# Patient Record
Sex: FEMALE | Race: WHITE
[De-identification: names, ages, dates, MRNs, and addresses within clinical notes are randomized per-mention and may not be internally consistent; named-entity substitution may affect disease eponyms.]

---

## 2018-04-17 ENCOUNTER — HOSPITAL ENCOUNTER (EMERGENCY)
Dept: HOSPITAL 45 - C.EDB | Age: 68
Discharge: HOME | End: 2018-04-17
Payer: COMMERCIAL

## 2018-04-17 VITALS
WEIGHT: 195.99 LBS | HEIGHT: 64.02 IN | HEIGHT: 64.02 IN | WEIGHT: 195.99 LBS | BODY MASS INDEX: 33.46 KG/M2 | BODY MASS INDEX: 33.46 KG/M2

## 2018-04-17 VITALS — HEART RATE: 71 BPM | SYSTOLIC BLOOD PRESSURE: 160 MMHG | OXYGEN SATURATION: 98 % | DIASTOLIC BLOOD PRESSURE: 89 MMHG

## 2018-04-17 VITALS — TEMPERATURE: 97.34 F

## 2018-04-17 DIAGNOSIS — R03.0: ICD-10-CM

## 2018-04-17 DIAGNOSIS — H43.392: Primary | ICD-10-CM

## 2018-04-17 DIAGNOSIS — M79.604: ICD-10-CM

## 2018-04-17 LAB
ALBUMIN SERPL-MCNC: 4 GM/DL (ref 3.4–5)
ALP SERPL-CCNC: 83 U/L (ref 45–117)
ALT SERPL-CCNC: 30 U/L (ref 12–78)
AST SERPL-CCNC: 22 U/L (ref 15–37)
BASOPHILS # BLD: 0.05 K/UL (ref 0–0.2)
BASOPHILS NFR BLD: 0.6 %
BUN SERPL-MCNC: 14 MG/DL (ref 7–18)
CALCIUM SERPL-MCNC: 9.5 MG/DL (ref 8.5–10.1)
CO2 SERPL-SCNC: 26 MMOL/L (ref 21–32)
CREAT SERPL-MCNC: 0.85 MG/DL (ref 0.6–1.2)
EOS ABS #: 0.53 K/UL (ref 0–0.5)
EOSINOPHIL NFR BLD AUTO: 227 K/UL (ref 130–400)
GLUCOSE SERPL-MCNC: 95 MG/DL (ref 70–99)
HCT VFR BLD CALC: 43.8 % (ref 37–47)
HGB BLD-MCNC: 15.6 G/DL (ref 12–16)
IG#: 0.01 K/UL (ref 0–0.02)
IMM GRANULOCYTES NFR BLD AUTO: 33.7 %
INR PPP: 1 (ref 0.9–1.1)
LYMPHOCYTES # BLD: 2.69 K/UL (ref 1.2–3.4)
MCH RBC QN AUTO: 32.4 PG (ref 25–34)
MCHC RBC AUTO-ENTMCNC: 35.6 G/DL (ref 32–36)
MCV RBC AUTO: 90.9 FL (ref 80–100)
MONO ABS #: 0.47 K/UL (ref 0.11–0.59)
MONOCYTES NFR BLD: 5.9 %
NEUT ABS #: 4.24 K/UL (ref 1.4–6.5)
NEUTROPHILS # BLD AUTO: 6.6 %
NEUTROPHILS NFR BLD AUTO: 53.1 %
PMV BLD AUTO: 11.3 FL (ref 7.4–10.4)
POTASSIUM SERPL-SCNC: 4.1 MMOL/L (ref 3.5–5.1)
PROT SERPL-MCNC: 8.6 GM/DL (ref 6.4–8.2)
PTT PATIENT: 28 SECONDS (ref 21–31)
RED CELL DISTRIBUTION WIDTH CV: 12.7 % (ref 11.5–14.5)
RED CELL DISTRIBUTION WIDTH SD: 42.3 FL (ref 36.4–46.3)
SODIUM SERPL-SCNC: 137 MMOL/L (ref 136–145)
WBC # BLD AUTO: 7.99 K/UL (ref 4.8–10.8)

## 2018-04-17 NOTE — EMERGENCY ROOM VISIT NOTE
History


Report prepared by Nae:  Trisha Fregoso


Under the Supervision of:  Dr. Raz Ellison D.O.


First contact with patient:  16:14


Chief Complaint:  EYE ASSESSMENT


Stated Complaint:  LEFT EYE FLASHING,BLACK RAGGED EDGES





History of Present Illness


The patient is a 67 year old female who presents to the Emergency Room with 

complaints of persistent left eye vision changes starting this afternoon. After 

lunch today she started seeing semi Grayling flashing in her left eye. Then there 

were black edges around her vision and blurriness. She spoke with a nurse at 

her PCP's office and was sent to the ED. Her vision cleared up enough for her 

to drive to the ED. She is still having increased floaters in her left eye. She 

currently has a slight headache. She has had right leg pain for 1 month now. 

She denies any SOB, nausea, vomiting, leg swelling, fever, chills, or 

dizziness. She has no other complaints. She denies any recent illness. She has 

seen ophthalmology in the past, but has never had any history of glaucoma or 

other eye problems. She has a history of stage 1 uterine cancer for which she 

had a hysterectomy. She denies any history of hypertension, diabetes, thyroid 

problems, or other medical problems.





   Source of History:  patient


   Onset:  this afternoon


   Position:  eye (left)


   Quality:  other (vision changes)


   Timing:  other (persistent)


   Associated Symptoms:  + headache, No fevers, No chills, No SOB, No nausea, 

No vomiting


Note:


Pt reports right leg pain.





Review of Systems


See HPI for pertinent positives & negatives. A total of 10 systems reviewed and 

were otherwise negative.





Past Medical & Surgical


Medical Problems:


(1) Uterine cancer








Family History


No pertinent family history stated.





Social History


Smoking Status:  Never Smoker


Marital Status:  


Occupation Status:  employed





Physical Exam


Vital Signs











  Date Time  Temp Pulse Resp B/P (MAP) Pulse Ox O2 Delivery O2 Flow Rate FiO2


 


4/17/18 19:22  71 17 160/89 98   


 


4/17/18 17:53  72 16 166/103 98 Room Air  


 


4/17/18 16:11 36.3 77 16 188/101 97 Room Air  











Physical Exam


GENERAL:  Patient is awake, alert, and in no acute distress. Patient is resting 

comfortably and showing no signs of anxiety


EYES: The conjunctivae are clear.  The pupils are round and reactive. 

Intraocular pressures were obtained, right eye 17, left eye 14. 


EARS, NOSE, MOUTH AND THROAT: The nose is without any evidence of any 

deformity. Mucous membranes are moist tongue is midline 


NECK: The neck is nontender and supple.


RESPIRATORY: Normal respiratory effort is noted there is no evidence of 

wheezing rhonchi or rales


CARDIOVASCULAR:  Regular rate and rhythm noted there no murmurs rubs or gallops 

normal S1 normal S2 


GASTROINTESTINAL: The abdomen is soft. Bowel sounds are present in all 

quadrants. Abdomen is nontender


MUSCULOSKELETAL/EXTREMITIES: There is no evidence of gross deformity full range 

of motion is noted in the hips and shoulders


SKIN: There is no obvious evidence of any rash. There are no petechiae, pallor 

or cyanosis noted. 


NEUROLOGIC:  Patient is awake alert and oriented x3 strength is symmetric 

patellar reflexes are 2+ bilaterally





Medical Decision & Procedures


ER Provider


Diagnostic Interpretation:


X-ray results as stated below per interpretation by me and the radiologist. 

Radiology results as stated below per my review and radiologist interpretation:





CHEST ONE VIEW PORTABLE





CLINICAL HISTORY: Altered mental status. Weakness.    





COMPARISON STUDY:  No previous studies for comparison.





FINDINGS: The cardiac and mediastinal contours are normal. There is no evidence


of focal pulmonary consolidation. There is no evidence of failure. No pleural


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





IMPRESSION: No active disease in the chest.





Electronically signed by:  Eduardo Ortega M.D.


4/17/2018 5:00 PM





Dictated Date/Time:  4/17/2018 5:00 PM








CT HEAD WITHOUT CONTRAST (CT)





CLINICAL HISTORY: Acute change in mental status    





COMPARISON STUDY:  No previous studies for comparison.





TECHNIQUE:  Axial CT of the brain is performed from the vertex to the skull


base. IV contrast was not administered for this examination. A dose lowering


technique was utilized adhering to the principles of ALARA.


 





CT DOSE: 537.48 mGy.cm





FINDINGS:





No intra or extra-axial mass lesions are visualized. There is no CT evidence of


acute cortical infarction. There is no evidence of midline shift. There is no


acute  hemorrhage. No calvarial fractures are visualized. 


There are minimal white matter hypodensities likely on a small vessel basis.


There is no evidence of pathologic ventricular dilatation.


There is a small right maxillary sinus air-fluid level.





IMPRESSION: 


1. Small right maxillary sinus air-fluid level


2. Otherwise no acute intracranial findings.





Electronically signed by:  Eduardo Ortega M.D.


4/17/2018 5:50 PM





Dictated Date/Time:  4/17/2018 5:48 PM








ULTRASOUND R VENOUS DOPP LOWER EXT UNILAT 





CLINICAL HISTORY: Right lower extremity pain    





COMPARISON STUDY:  No previous studies for comparison. 





FINDINGS: Real-time and color flow Doppler imaging were performed. Flow was seen


within the femoral, popliteal and calf veins with no intraluminal thrombus


demonstrated. The saphenous vein is patent.





IMPRESSION: No evidence of right lower extremity DVT.





Electronically signed by:  Eduardo Ortega M.D.


4/17/2018 6:10 PM





Dictated Date/Time:  4/17/2018 6:10 PM





Laboratory Results


4/17/18 17:00








Red Blood Count 4.82, Mean Corpuscular Volume 90.9, Mean Corpuscular Hemoglobin 

32.4, Mean Corpuscular Hemoglobin Concent 35.6, Mean Platelet Volume 11.3, 

Neutrophils (%) (Auto) 53.1, Lymphocytes (%) (Auto) 33.7, Monocytes (%) (Auto) 

5.9, Eosinophils (%) (Auto) 6.6, Basophils (%) (Auto) 0.6, Neutrophils # (Auto) 

4.24, Lymphocytes # (Auto) 2.69, Monocytes # (Auto) 0.47, Eosinophils # (Auto) 

0.53, Basophils # (Auto) 0.05





4/17/18 17:00

















Test


  4/17/18


17:00 4/17/18


17:30


 


White Blood Count


  7.99 K/uL


(4.8-10.8) 


 


 


Red Blood Count


  4.82 M/uL


(4.2-5.4) 


 


 


Hemoglobin


  15.6 g/dL


(12.0-16.0) 


 


 


Hematocrit 43.8 % (37-47)  


 


Mean Corpuscular Volume


  90.9 fL


() 


 


 


Mean Corpuscular Hemoglobin


  32.4 pg


(25-34) 


 


 


Mean Corpuscular Hemoglobin


Concent 35.6 g/dl


(32-36) 


 


 


Platelet Count


  227 K/uL


(130-400) 


 


 


Mean Platelet Volume


  11.3 fL


(7.4-10.4) 


 


 


Neutrophils (%) (Auto) 53.1 %  


 


Lymphocytes (%) (Auto) 33.7 %  


 


Monocytes (%) (Auto) 5.9 %  


 


Eosinophils (%) (Auto) 6.6 %  


 


Basophils (%) (Auto) 0.6 %  


 


Neutrophils # (Auto)


  4.24 K/uL


(1.4-6.5) 


 


 


Lymphocytes # (Auto)


  2.69 K/uL


(1.2-3.4) 


 


 


Monocytes # (Auto)


  0.47 K/uL


(0.11-0.59) 


 


 


Eosinophils # (Auto)


  0.53 K/uL


(0-0.5) 


 


 


Basophils # (Auto)


  0.05 K/uL


(0-0.2) 


 


 


RDW Standard Deviation


  42.3 fL


(36.4-46.3) 


 


 


RDW Coefficient of Variation


  12.7 %


(11.5-14.5) 


 


 


Immature Granulocyte % (Auto) 0.1 %  


 


Immature Granulocyte # (Auto)


  0.01 K/uL


(0.00-0.02) 


 


 


Prothrombin Time


  10.0 SECONDS


(9.0-12.0) 


 


 


Prothromb Time International


Ratio 1.0 (0.9-1.1) 


  


 


 


Activated Partial


Thromboplast Time 28.0 SECONDS


(21.0-31.0) 


 


 


Partial Thromboplastin Ratio 1.1  


 


Anion Gap


  4.0 mmol/L


(3-11) 


 


 


Est Creatinine Clear Calc


Drug Dose 71.3 ml/min 


  


 


 


Estimated GFR (


American) 83.3 


  


 


 


Estimated GFR (Non-


American 71.9 


  


 


 


BUN/Creatinine Ratio 16.3 (10-20)  


 


Calcium Level


  9.5 mg/dl


(8.5-10.1) 


 


 


Magnesium Level


  2.3 mg/dl


(1.8-2.4) 


 


 


Total Bilirubin


  0.3 mg/dl


(0.2-1) 


 


 


Direct Bilirubin


  0.1 mg/dl


(0-0.2) 


 


 


Aspartate Amino Transf


(AST/SGOT) 22 U/L (15-37) 


  


 


 


Alanine Aminotransferase


(ALT/SGPT) 30 U/L (12-78) 


  


 


 


Alkaline Phosphatase


  83 U/L


() 


 


 


Troponin I


  < 0.015 ng/ml


(0-0.045) 


 


 


Total Protein


  8.6 gm/dl


(6.4-8.2) 


 


 


Albumin


  4.0 gm/dl


(3.4-5.0) 


 


 


Thyroid Stimulating Hormone


(TSH) 4.840 uIu/ml


(0.300-4.500) 


 


 


Urine Color  YELLOW 


 


Urine Appearance  CLEAR (CLEAR) 


 


Urine pH  6.5 (4.5-7.5) 


 


Urine Specific Gravity


  


  1.009


(1.000-1.030)


 


Urine Protein  NEG (NEG) 


 


Urine Glucose (UA)  NEG (NEG) 


 


Urine Ketones  NEG (NEG) 


 


Urine Occult Blood  TRACE (NEG) 


 


Urine Nitrite  NEG (NEG) 


 


Urine Bilirubin  NEG (NEG) 


 


Urine Urobilinogen  NEG (NEG) 


 


Urine Leukocyte Esterase  NEG (NEG) 


 


Urine WBC (Auto)  1-5 /hpf (0-5) 


 


Urine RBC (Auto)  0-4 /hpf (0-4) 


 


Urine Hyaline Casts (Auto)  0 /lpf (0-5) 


 


Urine Epithelial Cells (Auto)


  


  5-10 /lpf


(0-5)


 


Urine Bacteria (Auto)  NEG (NEG) 





Laboratory results per my review.





Procedure


Slit Lamp Examination





Indication: left eye floaters





The left eye was prepped with topical proparacaine.  Slit lamp examination was 

performed in the standard fashion.  Cornea appeared clear.  Anterior chamber 

clear.  Scleral injection not present. No discharge present. Fluorescein 

examination performed and revealed no uptake on the cornea.  No foreign bodies 

noted. Negative Zach sign.  The patient tolerated the procedure well without 

complication.








Puff tonometry





Intraocular pressures were obtained: right eye 17, left eye 14.





ECG Per My Interpretation


Indication:  other


Rate (beats per minute):  67


Rhythm:  normal sinus


Findings:  ST depression (Inferior), T-wave inversion (Inferior), no ectopy


Comparison ECG Date:  prior EKG from [facility]


Change:  no significant change





ED Course


1619: The patient was evaluated in room A10. A complete history and physical 

examination were performed. Slit lamp examination and puff tonometry was 

performed as above. 





1639: I discussed the patient's case with Dr. Young, [facility] [place] 

ophthalmology. He recommends follow up within 24 hours. 





1649: I reevaluated the patient. I updated her on the plan.





1850: I reevaluated the patient. I updated her on the results.  has 

spoken with the patient.





1911: Upon reevaluation, the patient is resting comfortably. I discussed the 

results and treatment plan with her. She verbalized agreement of the treatment 

plan. She was discharged home.





Medical Decision


Prior records/ancillary studies reviewed.


Triage Nursing notes reviewed.





Differential diagnosis:


Etiologies such as migraine headache, meningitis, sinusitis, CO exposure, ICH, 

SAH, infection, tumor, headache, sinus thrombosis, arterial dissection, as well 

as others were entertained.





The patient is a 67-year-old female who presented to emergency department for 

an evaluation of visual disturbances.  She started noticing decreased vision as 

well as black spots in her vision.  She called her primary care physician and 

was sent to the emergency department.  The patient had no focal neurologic 

deficits.  I discussed her case with the on-call ophthalmologist.  They 

recommended further workup in the emergency department for a central nervous 

system cause but also recommended that the patient be seen in the office as 

soon as possible for further evaluation and likely dilation and evaluation of 

her intraocular contents.  The patient was also found to have an abnormal EKG.  

She has no chest pain or anginal equivalent at this time.  She was given copies 

of her EKG from today as well as her previous EKG to take with her to her 

primary care physician.  I do think she may require further workup of this 

including stress testing or echocardiogram.  She was encouraged to return the 

emergency department immediately if symptoms change worsen or the need arises.





Medication Reconcilliation


Current Medication List:  was personally reviewed by me





Blood Pressure Screening


Patient's blood pressure:  Elevated blood pressure


Blood pressure disposition:  Referred to PCP





Consults


Time Called:  1630


Consulting Physician:  [facility] [place] ophthalmology


Returned Call:  1638


I discussed the patient's case with him. He recommends follow up within 24 

hours.





Impression





 Primary Impression:  


 Floaters





Scribe Attestation


The scribe's documentation has been prepared under my direction and personally 

reviewed by me in its entirety. I confirm that the note above accurately 

reflects all work, treatment, procedures, and medical decision making performed 

by me.





Departure Information


Dispostion


Home / Self-Care





Referrals


Vince Morrison M.D.([facility]) (PCP)





Forms


HOME CARE DOCUMENTATION FORM,                                                 

               IMPORTANT VISIT INFORMATION, WORK / SCHOOL INSTRUCTIONS





Patient Instructions


Flashes and Floaters, Flashes and Floaters Tx, [facility]





Additional Instructions





Continue all medications as prescribed.  Follow-up with the ophthalmologist 

tomorrow.  Return to the emergency department immediately if symptoms change 

worsen or the need arises.  I would recommend he follow-up with your family 

doctor to discuss the abnormalities noted on her EKG today.  You may require a 

referral to a cardiologist.





Problem Qualifiers








 Primary Impression:  


 Floaters


 Laterality:  left  Qualified Codes:  H43.392 - Other vitreous opacities, left 

eye

## 2018-04-17 NOTE — DIAGNOSTIC IMAGING REPORT
CT HEAD WITHOUT CONTRAST (CT)



CLINICAL HISTORY: Acute change in mental status    



COMPARISON STUDY:  No previous studies for comparison.



TECHNIQUE:  Axial CT of the brain is performed from the vertex to the skull

base. IV contrast was not administered for this examination. A dose lowering

technique was utilized adhering to the principles of ALARA.

 



CT DOSE: 537.48 mGy.cm



FINDINGS:



No intra or extra-axial mass lesions are visualized. There is no CT evidence of

acute cortical infarction. There is no evidence of midline shift. There is no

acute  hemorrhage. No calvarial fractures are visualized. 

There are minimal white matter hypodensities likely on a small vessel basis.

There is no evidence of pathologic ventricular dilatation.

There is a small right maxillary sinus air-fluid level.



IMPRESSION: 

1. Small right maxillary sinus air-fluid level

2. Otherwise no acute intracranial findings.







Electronically signed by:  Eduardo Ortega M.D.

4/17/2018 5:50 PM



Dictated Date/Time:  4/17/2018 5:48 PM

## 2018-04-17 NOTE — DIAGNOSTIC IMAGING REPORT
CHEST ONE VIEW PORTABLE



CLINICAL HISTORY: Altered mental status. Weakness.    



COMPARISON STUDY:  No previous studies for comparison.



FINDINGS: The cardiac and mediastinal contours are normal. There is no evidence

of focal pulmonary consolidation. There is no evidence of failure. No pleural

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



IMPRESSION: No active disease in the chest.







Electronically signed by:  Eduardo Ortega M.D.

4/17/2018 5:00 PM



Dictated Date/Time:  4/17/2018 5:00 PM

## 2018-04-17 NOTE — DIAGNOSTIC IMAGING REPORT
ULTRASOUND R VENOUS DOPP LOWER EXT UNILAT 



CLINICAL HISTORY: Right lower extremity pain    



COMPARISON STUDY:  No previous studies for comparison. 



FINDINGS: Real-time and color flow Doppler imaging were performed. Flow was seen

within the femoral, popliteal and calf veins with no intraluminal thrombus

demonstrated. The saphenous vein is patent.



IMPRESSION: No evidence of right lower extremity DVT.







Electronically signed by:  Eduardo Ortega M.D.

4/17/2018 6:10 PM



Dictated Date/Time:  4/17/2018 6:10 PM